# Patient Record
Sex: MALE | Race: WHITE | URBAN - METROPOLITAN AREA
[De-identification: names, ages, dates, MRNs, and addresses within clinical notes are randomized per-mention and may not be internally consistent; named-entity substitution may affect disease eponyms.]

---

## 2017-12-21 ENCOUNTER — OFFICE VISIT (OUTPATIENT)
Dept: PRIMARY CARE CLINIC | Age: 57
End: 2017-12-21
Payer: COMMERCIAL

## 2017-12-21 VITALS
TEMPERATURE: 98.2 F | HEART RATE: 69 BPM | HEIGHT: 71 IN | SYSTOLIC BLOOD PRESSURE: 141 MMHG | WEIGHT: 245 LBS | DIASTOLIC BLOOD PRESSURE: 87 MMHG | BODY MASS INDEX: 34.3 KG/M2 | RESPIRATION RATE: 20 BRPM

## 2017-12-21 DIAGNOSIS — Z12.11 SCREENING FOR COLORECTAL CANCER: ICD-10-CM

## 2017-12-21 DIAGNOSIS — Z12.12 SCREENING FOR COLORECTAL CANCER: ICD-10-CM

## 2017-12-21 DIAGNOSIS — Z23 NEED FOR INFLUENZA VACCINATION: ICD-10-CM

## 2017-12-21 DIAGNOSIS — Z00.00 WELLNESS EXAMINATION: Primary | ICD-10-CM

## 2017-12-21 PROCEDURE — 90471 IMMUNIZATION ADMIN: CPT | Performed by: NURSE PRACTITIONER

## 2017-12-21 PROCEDURE — 90686 IIV4 VACC NO PRSV 0.5 ML IM: CPT | Performed by: NURSE PRACTITIONER

## 2017-12-21 PROCEDURE — 99396 PREV VISIT EST AGE 40-64: CPT | Performed by: NURSE PRACTITIONER

## 2017-12-21 ASSESSMENT — ENCOUNTER SYMPTOMS
NAUSEA: 0
DIARRHEA: 0
SHORTNESS OF BREATH: 0
WHEEZING: 0
ABDOMINAL PAIN: 0
SORE THROAT: 0
BACK PAIN: 1
RHINORRHEA: 0
CONSTIPATION: 0
VOMITING: 0
COUGH: 0

## 2017-12-21 NOTE — PROGRESS NOTES
After obtaining consent, and per orders of Dr. Eliseo Pettit, injection of Influenza Vaccine given in Left deltoid by Justa Rangel. Patient instructed to remain in clinic for 20 minutes afterwards, and to report any adverse reaction to me immediately.

## 2017-12-21 NOTE — PATIENT INSTRUCTIONS
Patient Education        Food as Fuel: Care Instructions  Your Care Instructions    A healthy, balanced diet gives your body nutrients. Nutrients are like fuel for your body. They give you energy. And they keep your heart beating, your brain active, and your muscles working. They also help to build and strengthen bones, muscles, and other body tissues. Your body needs three major nutrients for energy. These are carbohydrate, protein, and fat. · Carbohydrate provides energy for your brain, muscles, heart, and lungs. It is found in bread, cereal, rice, pasta, fruits, vegetables, milk, yogurt, and sugar. · Protein provides energy and helps build and repair your body's cells. It is found in meat, poultry, fish, cooked dry beans, cheese, tofu and other soy products, nuts and seeds, and milk and milk products. · Fat provides energy, helps build the covering around nerves in your body, and helps make hormones. Fat is found in butter, margarine, oil, mayonnaise, salad dressing, and nuts. It is also found in most foods that come from animals, such as meat and milk products. Many foods also have fat added to them. Your body needs all three of these nutrients to be healthy. If you choose a good mix of foods, you can help your body get the right amounts of carbohydrate, protein, and fat. It can also keep you at a healthy weight. Follow-up care is a key part of your treatment and safety. Be sure to make and go to all appointments, and call your doctor if you are having problems. It's also a good idea to know your test results and keep a list of the medicines you take. How can you care for yourself at home? Eat a balanced diet  · Try to eat variety of healthy foods every day. These include:  ¨ 5 to 8 ounces of bread, cereal, crackers, rice, or pasta. An ounce is about 1 slice of bread, ¾ cup of breakfast cereal, or ½ cup of cooked rice, cereal, or pasta.  Choose whole-grain products for at least half of your grain servings. ¨ 2 to 3 cups of vegetables. Be sure to include:  § Dark green vegetables such as broccoli and spinach. § Orange vegetables such as carrots and sweet potatoes. ¨ 1½ to 2 cups of fresh, frozen, or canned fruit. A banana, an orange, or a large apple equals 1 cup. ¨ 3 cups of nonfat or low-fat milk, yogurt, or other milk products. ¨ 5 to 6½ ounces of protein foods. These include chicken, fish, lean meat, beans, nuts, and seeds. One egg equals 1 ounce of meat, poultry, or fish. A ½ cup of cooked beans equals 2 ounces of protein. Stay fueled all day  · Start your day with breakfast. If you don't have time to sit down for a bowl of cereal in the morning, try something that you can eat \"on the go. \" Try a piece of whole wheat bread with peanut butter or a container of yogurt with frozen berries mixed in. · Eat regularly scheduled meals and snacks. If you miss a meal, you may overeat at the next meal. Or you may choose a less healthy snack. · Drink enough water. (If you have kidney, heart, or liver disease and have to limit fluids, talk with your doctor before you increase your fluid intake.)  Where can you learn more? Go to https://[employer]. org and sign in to your [employer] account. Enter V662 in the [facility] box to learn more about \"Food as Fuel: Care Instructions. \"     If you do not have an account, please click on the \"Sign Up Now\" link. Current as of: May 12, 2017  Content Version: 11.4  © 8300-8303 [employer]. Care instructions adapted under license by [facility] [employer] [facility] ([facility]). If you have questions about a medical condition or this instruction, always ask your healthcare professional. [address] any warranty or liability for your use of this information.

## 2017-12-21 NOTE — PROGRESS NOTES
Name: Dax Vicente  : 1960         Chief Complaint:     Chief Complaint   Patient presents with    Annual Exam     Denies any c/o's at this time. History of Present Illness:      Dax Vicente is a 62 y.o.  male who presents with Annual Exam (Denies any c/o's at this time. )      Diamond Dalton is here today for a wellness visit. Currently feeling well and has no medical issues or concerns. States he is not currently taking any medications. Eating relatively well and active with his job, does not exercise. States he will have colonoscopy completed for routine screening. Will have labs at health fair with work. Past Medical History:     History reviewed. No pertinent past medical history. Reviewed all health maintenance requirements and ordered appropriate tests  Health Maintenance Due   Topic Date Due    Diabetes screen  2000    Colon cancer screen colonoscopy  2010       Past Surgical History:     History reviewed. No pertinent surgical history. Medications:       Prior to Admission medications    Not on File        Allergies:       Review of patient's allergies indicates no known allergies. Social History:     Tobacco:    reports that he has quit smoking. He has never used smokeless tobacco.  Alcohol:      reports that he drinks alcohol. Drug Use:  reports that he does not use drugs. Family History:     Family History   Problem Relation Age of Onset    Cancer Mother     High Blood Pressure Maternal Grandmother        Review of Systems:     Positive and Negative as described in HPI    Review of Systems   Constitutional: Negative for chills and fever. HENT: Negative for congestion, rhinorrhea and sore throat. Eyes: Negative for visual disturbance. Respiratory: Negative for cough, shortness of breath and wheezing. Cardiovascular: Negative for chest pain and palpitations.    Gastrointestinal: Negative for abdominal pain, constipation, diarrhea, nausea and CHOL 159 12/26/2016    HDL 44 12/26/2016    PSA 0.71 12/26/2016       Assessment/Plan:     1. Wellness examination     2. Need for influenza vaccination  INFLUENZA, QUADV, 3 YRS AND OLDER, IM, PF, PREFILL SYR OR SDV, 0.5ML (FLUZONE QUADV, PF)   3. Screening for colorectal cancer  Coy Christina MD, [facility] [place]       1. Brad Doyle received counseling on the following healthy behaviors: nutrition and exercise  2. Patient given educational materials - see patient instructions  3. Was a self-tracking handout given in paper form or via [employer]? No  If yes, see orders or list here. 4.  Discussed use, benefit, and side effects of prescribed medications. Barriers to medication compliance addressed. All patient questions answered. Pt voiced understanding. 5.  Reviewed prior labs and health maintenance  6. Continue current medications, diet and exercise. Completed Refills   Requested Prescriptions      No prescriptions requested or ordered in this encounter         Return in about 1 year (around 12/21/2018) for check up.

## 2017-12-22 ENCOUNTER — TELEPHONE (OUTPATIENT)
Dept: PRIMARY CARE CLINIC | Age: 57
End: 2017-12-22

## 2017-12-22 DIAGNOSIS — Z12.11 COLON CANCER SCREENING: Primary | ICD-10-CM

## 2017-12-23 RX ORDER — SODIUM, POTASSIUM,MAG SULFATES 17.5-3.13G
SOLUTION, RECONSTITUTED, ORAL ORAL
Qty: 2 BOTTLE | Refills: 0 | OUTPATIENT
Start: 2017-12-23

## 2018-06-13 LAB
CHOLESTEROL, TOTAL: 164 MG/DL
CHOLESTEROL/HDL RATIO: 3.4
GLUCOSE BLD-MCNC: 115 MG/DL
HDLC SERPL-MCNC: 48 MG/DL (ref 35–70)
LDL CHOLESTEROL CALCULATED: 96 MG/DL (ref 0–160)
PROSTATE SPECIFIC ANTIGEN: 0.68 NG/ML
TRIGL SERPL-MCNC: 100 MG/DL
VLDLC SERPL CALC-MCNC: NORMAL MG/DL

## 2025-04-09 ENCOUNTER — OFFICE VISIT (OUTPATIENT)
Dept: PRIMARY CARE CLINIC | Age: 65
End: 2025-04-09
Payer: COMMERCIAL

## 2025-04-09 VITALS
OXYGEN SATURATION: 96 % | SYSTOLIC BLOOD PRESSURE: 136 MMHG | HEART RATE: 85 BPM | TEMPERATURE: 97.9 F | DIASTOLIC BLOOD PRESSURE: 80 MMHG | BODY MASS INDEX: 35.66 KG/M2 | HEIGHT: 71 IN | WEIGHT: 254.7 LBS

## 2025-04-09 DIAGNOSIS — Z13.1 DIABETES MELLITUS SCREENING: ICD-10-CM

## 2025-04-09 DIAGNOSIS — H66.001 NON-RECURRENT ACUTE SUPPURATIVE OTITIS MEDIA OF RIGHT EAR WITHOUT SPONTANEOUS RUPTURE OF TYMPANIC MEMBRANE: ICD-10-CM

## 2025-04-09 DIAGNOSIS — Z23 NEED FOR PNEUMOCOCCAL VACCINE: ICD-10-CM

## 2025-04-09 DIAGNOSIS — Z00.00 WELLNESS EXAMINATION: Primary | ICD-10-CM

## 2025-04-09 DIAGNOSIS — Z12.11 COLON CANCER SCREENING: ICD-10-CM

## 2025-04-09 DIAGNOSIS — Z12.5 SCREENING PSA (PROSTATE SPECIFIC ANTIGEN): ICD-10-CM

## 2025-04-09 DIAGNOSIS — Z13.220 LIPID SCREENING: ICD-10-CM

## 2025-04-09 DIAGNOSIS — Z23 NEED FOR TETANUS BOOSTER: ICD-10-CM

## 2025-04-09 PROCEDURE — 90677 PCV20 VACCINE IM: CPT | Performed by: NURSE PRACTITIONER

## 2025-04-09 PROCEDURE — 90715 TDAP VACCINE 7 YRS/> IM: CPT | Performed by: NURSE PRACTITIONER

## 2025-04-09 PROCEDURE — 90471 IMMUNIZATION ADMIN: CPT | Performed by: NURSE PRACTITIONER

## 2025-04-09 PROCEDURE — 99386 PREV VISIT NEW AGE 40-64: CPT | Performed by: NURSE PRACTITIONER

## 2025-04-09 PROCEDURE — 90472 IMMUNIZATION ADMIN EACH ADD: CPT | Performed by: NURSE PRACTITIONER

## 2025-04-09 SDOH — ECONOMIC STABILITY: FOOD INSECURITY: WITHIN THE PAST 12 MONTHS, THE FOOD YOU BOUGHT JUST DIDN'T LAST AND YOU DIDN'T HAVE MONEY TO GET MORE.: NEVER TRUE

## 2025-04-09 SDOH — ECONOMIC STABILITY: FOOD INSECURITY: WITHIN THE PAST 12 MONTHS, YOU WORRIED THAT YOUR FOOD WOULD RUN OUT BEFORE YOU GOT MONEY TO BUY MORE.: NEVER TRUE

## 2025-04-09 ASSESSMENT — ENCOUNTER SYMPTOMS
VOMITING: 0
NAUSEA: 0
ABDOMINAL PAIN: 0
DIARRHEA: 0
WHEEZING: 0
RHINORRHEA: 0
SORE THROAT: 0
SHORTNESS OF BREATH: 0
COUGH: 0
CONSTIPATION: 0

## 2025-04-09 ASSESSMENT — PATIENT HEALTH QUESTIONNAIRE - PHQ9
SUM OF ALL RESPONSES TO PHQ QUESTIONS 1-9: 0
1. LITTLE INTEREST OR PLEASURE IN DOING THINGS: NOT AT ALL
SUM OF ALL RESPONSES TO PHQ QUESTIONS 1-9: 0
2. FEELING DOWN, DEPRESSED OR HOPELESS: NOT AT ALL
SUM OF ALL RESPONSES TO PHQ QUESTIONS 1-9: 0
SUM OF ALL RESPONSES TO PHQ QUESTIONS 1-9: 0

## 2025-04-09 NOTE — PATIENT INSTRUCTIONS
SURVEY:     You may be receiving a survey from [facility] [employer] regarding your visit today.     Please complete the survey to enable us to provide the highest quality of care to you and your family.     If you cannot score us a very good on any question, please call the office to discuss how we could have made your experience a very good one.     Thank you,    Atul Pettit, APRN-CNP  Juanita Natarajan, APRN-CNP  La, LPN  Neeal, CMA  Jp, CMA  Joycelyn, CMA  Kaycee, PCA  Kristina, CMA  Lizbeth, PM

## 2025-04-09 NOTE — PROGRESS NOTES
After obtaining consent, and per orders of VADIM Gallagher, injection of Boostrix given in Right deltoid by Kristina Ray MA. Patient instructed to remain in clinic for 20 minutes afterwards, and to report any adverse reaction to me immediately.      After obtaining consent, and per orders of VADIM Gallagher, injection of Prevnar-20 given in Left deltoid by Kristina Ray MA. Patient instructed to remain in clinic for 20 minutes afterwards, and to report any adverse reaction to me immediately.    
with Auto Differential    ALT    AST    PSA Screening    Lipid Panel    Basic Metabolic Panel      2. Non-recurrent acute suppurative otitis media of right ear without spontaneous rupture of tympanic membrane  amoxicillin-clavulanate (AUGMENTIN) 875-125 MG per tablet      3. Colon cancer screening  Fecal DNA Colorectal cancer screening (Cologuard)      4. Need for tetanus booster  Tdap, BOOSTRIX, (age 10 yrs+), IM      5. Need for pneumococcal vaccine  Pneumococcal, PCV20, PREVNAR 20, (age 6w+), IM, PF      6. Lipid screening  CBC with Auto Differential    ALT    AST    Lipid Panel    Basic Metabolic Panel      7. Diabetes mellitus screening  CBC with Auto Differential    ALT    AST    Lipid Panel    Basic Metabolic Panel      8. Screening PSA (prostate specific antigen)  PSA Screening        Continue healthy lifestyle.    Augmentin as directed.  Call with progress, sooner if not improving.    Wellness labs as ordered.  We will follow with results.      1.  Pk received counseling on the following healthy behaviors: nutrition and exercise  2.  Patient given educational materials - see patient instructions  3.  Was a self-tracking handout given in paper form or via [employer]? No  If yes, see orders or list here.  4.  Discussed use, benefit, and side effects of prescribed medications.  Barriers to medication compliance addressed.  All patient questions answered.Pt voiced understanding.   5.  Reviewed prior labs and health maintenance  6.  Continue current medications, diet and exercise.    Completed Refills   Requested Prescriptions     Signed Prescriptions Disp Refills    amoxicillin-clavulanate (AUGMENTIN) 875-125 MG per tablet 20 tablet 0     Sig: Take 1 tablet by mouth 2 times daily for 10 days         Return in about 1 year (around 4/9/2026) for Wellness Visit.

## 2025-04-13 LAB
ALT SERPL-CCNC: 33 U/L (ref 5–41)
ANION GAP SERPL CALCULATED.3IONS-SCNC: 10 MMOL/L (ref 7–16)
AST SERPL-CCNC: 20 U/L (ref 9–50)
BASOPHILS ABSOLUTE: 0.05 K/UL (ref 0–0.2)
BASOPHILS RELATIVE PERCENT: 1.1 % (ref 0–2)
BUN BLDV-MCNC: 9 MG/DL (ref 8–23)
CALCIUM SERPL-MCNC: 9.3 MG/DL (ref 8.6–10.5)
CHLORIDE BLD-SCNC: 103 MMOL/L (ref 96–107)
CHOLESTEROL, TOTAL: 148 MG/DL (ref 100–199)
CHOLESTEROL/HDL RATIO: 3.1 (ref 2–4.5)
CO2: 27 MMOL/L (ref 18–32)
CREAT SERPL-MCNC: 0.9 MG/DL (ref 0.67–1.3)
EGFR IF NONAFRICAN AMERICAN: 95 ML/MIN/1.73M2
EOSINOPHILS ABSOLUTE: 0.14 K/UL (ref 0–0.8)
EOSINOPHILS RELATIVE PERCENT: 3.1 % (ref 0–5)
GLUCOSE: 113 MG/DL (ref 70–100)
HCT VFR BLD CALC: 50.4 % (ref 39–52)
HDLC SERPL-MCNC: 48 MG/DL
HEMOGLOBIN: 17.2 G/DL (ref 13–18)
IMMATURE GRANS (ABS): 0.01 K/UL (ref 0–0.06)
IMMATURE GRANULOCYTES %: 0.2 % (ref 0–2)
LDL CHOLESTEROL: 84 MG/DL
LDL/HDL RATIO: 1.8
LYMPHOCYTES ABSOLUTE: 0.97 K/UL (ref 0.9–5.2)
LYMPHOCYTES RELATIVE PERCENT: 21.3 % (ref 20–45)
MCH RBC QN AUTO: 31.6 PG (ref 26–32)
MCHC RBC AUTO-ENTMCNC: 34.1 G/DL (ref 32–35)
MCV RBC AUTO: 93 FL (ref 75–100)
MONOCYTES ABSOLUTE: 0.45 K/UL (ref 0.1–1)
MONOCYTES RELATIVE PERCENT: 9.9 % (ref 0–13)
NEUTROPHILS ABSOLUTE: 2.93 K/UL (ref 1.9–8)
NEUTROPHILS RELATIVE PERCENT: 64.4 % (ref 45–75)
PDW BLD-RTO: 13.1 % (ref 11.2–14.8)
PLATELET # BLD: 147 THOUS/CMM (ref 140–440)
POTASSIUM SERPL-SCNC: 4.6 MMOL/L (ref 3.5–5.4)
PSA, ULTRASENSITIVE: 0.94 NG/ML (ref 0–4)
RBC # BLD: 5.45 MILL/CMM (ref 4.4–6.1)
SODIUM BLD-SCNC: 140 MMOL/L (ref 135–148)
TRIGL SERPL-MCNC: 81 MG/DL (ref 20–149)
VLDLC SERPL CALC-MCNC: 16 MG/DL
WBC # BLD: 4.6 THDS/CMM (ref 3.6–11)

## 2025-04-14 ENCOUNTER — RESULTS FOLLOW-UP (OUTPATIENT)
Dept: PRIMARY CARE CLINIC | Age: 65
End: 2025-04-14

## 2025-04-14 NOTE — TELEPHONE ENCOUNTER
----- Message from SHAMIKA Galalgher CNP sent at 4/14/2025 11:46 AM EDT -----  Results are normal, please call patient and make them aware.